# Patient Record
Sex: MALE | Race: WHITE | NOT HISPANIC OR LATINO | Employment: FULL TIME | URBAN - NONMETROPOLITAN AREA
[De-identification: names, ages, dates, MRNs, and addresses within clinical notes are randomized per-mention and may not be internally consistent; named-entity substitution may affect disease eponyms.]

---

## 2024-10-24 ENCOUNTER — APPOINTMENT (OUTPATIENT)
Dept: PULMONOLOGY | Facility: CLINIC | Age: 59
End: 2024-10-24
Payer: COMMERCIAL

## 2024-10-24 ENCOUNTER — HOSPITAL ENCOUNTER (OUTPATIENT)
Dept: RADIOLOGY | Facility: CLINIC | Age: 59
Discharge: HOME | End: 2024-10-24
Payer: COMMERCIAL

## 2024-10-24 VITALS
DIASTOLIC BLOOD PRESSURE: 69 MMHG | OXYGEN SATURATION: 97 % | TEMPERATURE: 97.3 F | WEIGHT: 207 LBS | HEIGHT: 67 IN | HEART RATE: 62 BPM | BODY MASS INDEX: 32.49 KG/M2 | SYSTOLIC BLOOD PRESSURE: 115 MMHG

## 2024-10-24 DIAGNOSIS — R06.02 SHORTNESS OF BREATH: Primary | ICD-10-CM

## 2024-10-24 DIAGNOSIS — R06.02 SHORTNESS OF BREATH: ICD-10-CM

## 2024-10-24 DIAGNOSIS — J43.2 CENTRILOBULAR EMPHYSEMA (MULTI): ICD-10-CM

## 2024-10-24 PROCEDURE — 71046 X-RAY EXAM CHEST 2 VIEWS: CPT

## 2024-10-24 PROCEDURE — 99205 OFFICE O/P NEW HI 60 MIN: CPT | Performed by: INTERNAL MEDICINE

## 2024-10-24 PROCEDURE — 3008F BODY MASS INDEX DOCD: CPT | Performed by: INTERNAL MEDICINE

## 2024-10-24 RX ORDER — ALBUTEROL SULFATE 90 UG/1
2 INHALANT RESPIRATORY (INHALATION) EVERY 4 HOURS PRN
COMMUNITY
Start: 2024-05-25

## 2024-10-24 RX ORDER — MONTELUKAST SODIUM 10 MG/1
10 TABLET ORAL
COMMUNITY
Start: 2023-09-15

## 2024-10-24 RX ORDER — LOSARTAN POTASSIUM 100 MG/1
100 TABLET ORAL
COMMUNITY
Start: 2024-05-28

## 2024-10-24 RX ORDER — ATORVASTATIN CALCIUM 40 MG/1
40 TABLET, FILM COATED ORAL
COMMUNITY
Start: 2024-08-19

## 2024-10-24 RX ORDER — INSULIN HUMAN 500 [IU]/ML
INJECTION, SOLUTION SUBCUTANEOUS
COMMUNITY
Start: 2024-05-13

## 2024-10-24 RX ORDER — PANTOPRAZOLE SODIUM 20 MG/1
20 TABLET, DELAYED RELEASE ORAL
COMMUNITY
Start: 2024-05-24 | End: 2025-05-24

## 2024-10-24 RX ORDER — FLUTICASONE FUROATE AND VILANTEROL TRIFENATATE 50; 25 UG/1; UG/1
1 POWDER RESPIRATORY (INHALATION)
COMMUNITY
Start: 2024-10-09

## 2024-10-25 NOTE — PROGRESS NOTES
Subjective   Patient ID: aGry Langley is a 59 y.o. male who presents for Cough and Shortness of Breath.  HPI  This a 59-year-old  male who I was asked to see because of cough and shortness of breath.  I reviewed her chest x-ray results from 2024 which shows that the lung fields were clear with no evidence of hilar or mediastinal adenopathy.  Patient reports that he had a partial thyroidectomy done about 4 years ago where a lymph node was removed and was found to be noncancerous.  Patient admits to having a tonsillectomy and adenoidectomy and also.  Family medical history he has 2 brothers with prostate cancer one of them who is .  He has a father that  from lung cancer and was a smoker he also has 1 of 2 sisters who are  from lung cancer.  Currently has a desk job most of the time.  He denies significant shortness of breath with his daily job but with increased activity he has periodic dyspnea.  Patient has insulin-dependent diabetes mellitus for last 40 years.  Patient has Breo 100/25 inhaler that he uses daily and as needed albuterol treatment.  When he has a cough he will use some Tessalon Perles.  Review of Systems  Patient did have secondhand smoke exposure from his parents.  Objective   Physical Exam  Oxygen saturation on room air at rest was 98%.  HEENT the patient has a class IV airway and chin was in good position.  Pulmonary, decreased breath sounds but clear.  Cardio, heart sounds are regular rate and rhythm.  GI, bowel sounds were heard in all quadrants with no tenderness on palpation of the abdomen.  Extremities, no pretibial edema cyanosis or clubbing.  Skin, no abnormal rashes or skin lesions were noted.  Psych, the patient is alert and oriented x 3.  Assessment/Plan        Impressions:  1.  History of dyspnea, rule out COPD, rule out asthma.  2.  On a peripheral review of his information, I did not see anything to indicate that he has underlying sarcoidosis which  was apparently a concern of a physician that he was seeing.  Recommendations:  1.  SST.  2.  Review chest x-rays and CT scans from reports and actual images.  3.  PA and lateral chest x-ray today  4.  I will review his pulmonary function testing results.  5.  Follow-up with the patient in 2 weeks.      This note was transcribed using the Dragon Dictation system.  There may be grammatical, punctuation, or verbiage errors that occur with voice recognition programs.      Ish Richter DO 10/25/24 7:45 AM

## 2024-11-05 DIAGNOSIS — R06.02 SHORTNESS OF BREATH: Primary | ICD-10-CM

## 2024-11-06 ENCOUNTER — TELEPHONE (OUTPATIENT)
Dept: PULMONOLOGY | Facility: CLINIC | Age: 59
End: 2024-11-06
Payer: COMMERCIAL

## 2024-11-06 NOTE — TELEPHONE ENCOUNTER
Pt advised Dr. Richter would like him to have a SST done prior to his follow up.  Pt scheduled 11/12/24 with 3pm arrival.  Pt advised of pre testing instructions.  Pt scheduled for a follow up 11/21/24 at 3:30p with Dr. Richter.  Pt verbalized understanding.

## 2024-11-07 ENCOUNTER — APPOINTMENT (OUTPATIENT)
Dept: PULMONOLOGY | Facility: CLINIC | Age: 59
End: 2024-11-07
Payer: COMMERCIAL

## 2024-11-12 ENCOUNTER — HOSPITAL ENCOUNTER (OUTPATIENT)
Dept: RESPIRATORY THERAPY | Facility: HOSPITAL | Age: 59
Discharge: HOME | End: 2024-11-12
Payer: COMMERCIAL

## 2024-11-12 DIAGNOSIS — R06.02 SHORTNESS OF BREATH: ICD-10-CM

## 2024-11-12 PROCEDURE — 94618 PULMONARY STRESS TESTING: CPT

## 2024-11-21 ENCOUNTER — APPOINTMENT (OUTPATIENT)
Dept: PULMONOLOGY | Facility: CLINIC | Age: 59
End: 2024-11-21
Payer: COMMERCIAL

## 2024-11-21 VITALS
BODY MASS INDEX: 32.65 KG/M2 | HEART RATE: 69 BPM | HEIGHT: 67 IN | TEMPERATURE: 97.3 F | WEIGHT: 208 LBS | SYSTOLIC BLOOD PRESSURE: 120 MMHG | OXYGEN SATURATION: 96 % | DIASTOLIC BLOOD PRESSURE: 76 MMHG

## 2024-11-21 DIAGNOSIS — R06.09 DYSPNEA ON EXERTION: Primary | ICD-10-CM

## 2024-11-21 PROCEDURE — 3008F BODY MASS INDEX DOCD: CPT | Performed by: INTERNAL MEDICINE

## 2024-11-21 PROCEDURE — 99213 OFFICE O/P EST LOW 20 MIN: CPT | Performed by: INTERNAL MEDICINE

## 2024-11-22 NOTE — PROGRESS NOTES
Subjective   Patient ID: Gary Langley is a 59 y.o. male who presents for No chief complaint on file..  HPI  Patient was seen today on a 2-week follow-up visit and had his chest x-ray done.'s x-ray was done on 10/24/2024 and showed the lung fields were clear.  The patient denies any change in his breathing with the use of Breo or albuterol inhaler.  I did review with the patient the results of his pulmonary function study done on 10-24 and this showed mild obstructive airway disease with no significant change postbronchodilator.  Review of Systems  Patient denies having any fever, chills, rhinitis or sore throat.  Objective   Physical Exam  Pulmonary, lungs were clear to auscultation bilaterally.  Cardio, heart sounds were regular rate and rhythm.  Extremities, no pretibial edema, cyanosis or clubbing.  Psych, the patient was alert and oriented x 3.  Assessment/Plan       Impressions:  1.  Mild dyspnea on exertion most likely related to fitness.  Recommendations:  1.  Can discontinue the Breo inhaler.  2.  If he has increased shortness of breath without the Breo or decreasing shortness of breath with use of albuterol alone then can contact our office for further evaluation.  3.  Follow-up with the patient as needed.      This note was transcribed using the Dragon Dictation system.  There may be grammatical, punctuation, or verbiage errors that occur with voice recognition programs.     Ish Richter DO 11/22/24 7:27 AM